# Patient Record
(demographics unavailable — no encounter records)

---

## 2025-04-03 NOTE — IMAGING
[de-identified] : [facility] Palpation: No tenderness to palpation or spasm in bilateral thoracic and lumbar paraspinal musculature, no SI joint tenderness to palpation ROM: limited all planes of motion Strength: 5/5 bilateral hip flexors, knee extensors, ankle dorsiflexors, EHL, ankle plantarflexors Sensation: Sensation present to light touch bilateral L2-S1 distributions Provocative maneuvers: + bilateral straight leg raise R>L  [Disc space narrowing] : Disc space narrowing [AP] : anteroposterior [There are no fractures, subluxations or dislocations. No significant abnormalities are seen] : There are no fractures, subluxations or dislocations. No significant abnormalities are seen

## 2025-04-03 NOTE — ASSESSMENT
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  Advised to clear medication with internist or PCP if any concurrent health problem with heart, blood pressure, or GI system exists.  Discussed LESI and indications for surgery  PT

## 2025-04-03 NOTE — HISTORY OF PRESENT ILLNESS
[de-identified] : 11/29/2021 [facility] MRI  - report noted in chart.   Ind. review- R sub-articular and NF HNP L4/5; L5/S1 bugle with central to R paracentral HNP abutting traversing root ===================================================================================== 04/03/2025: 42 year M presenting for evaluation of lower back pain. Onset: 2021 but exacerbated 1 week ago after climbing on a bunk bed. Pain across lower back radiates down BLE to thigh. No N/T. Patient reports was in Rehab in 2022, denies any injections or PT. Had MRI @[facility] 11/29/2021.